# Patient Record
Sex: MALE | Race: WHITE
[De-identification: names, ages, dates, MRNs, and addresses within clinical notes are randomized per-mention and may not be internally consistent; named-entity substitution may affect disease eponyms.]

---

## 2017-02-16 ENCOUNTER — HOSPITAL ENCOUNTER (OUTPATIENT)
Dept: HOSPITAL 61 - ECHO | Age: 47
Discharge: HOME | End: 2017-02-16
Attending: INTERNAL MEDICINE
Payer: COMMERCIAL

## 2017-02-16 DIAGNOSIS — I25.10: Primary | ICD-10-CM

## 2017-02-16 DIAGNOSIS — I35.1: ICD-10-CM

## 2017-02-16 DIAGNOSIS — I34.0: ICD-10-CM

## 2017-02-16 PROCEDURE — 93306 TTE W/DOPPLER COMPLETE: CPT

## 2017-02-16 NOTE — CARD
--------------- APPROVED REPORT --------------





EXAM: Two-dimensional and M-mode echocardiogram with Doppler and color Doppler.



Other Information 

Quality : GoodHR: 56bpm

Rhythm : NSR, Bradycardia



INDICATION

Cardiac Disease: CAD 



2D DIMENSIONS 

RVDd3.2 (2.9-3.5cm)Left Atrium(2D)4.3 (1.6-4.0cm)

IVSd0.9 (0.7-1.1cm)Aortic Root(2D)3.1 (2.0-3.7cm)

LVDd4.3 (3.9-5.9cm)LVOT Diameter2.0 (1.8-2.4cm)

PWd0.7 (0.7-1.1cm)LVDs2.7 (2.5-4.0cm)

FS (%) 38.4 %SV57.9 ml

LVEF(%)68.9 (>50%)



Aortic Valve

AoV Peak Higinio.103.6cm/sAoV VTI24.3cm

AO Peak GR.4.3mmHgLVOT Peak Higinio.78.5cm/s

LVOT  VTI 17.91cmAO Mean GR.2mmHg

OJSE (VMAX)2.80ow2AXS   (VTI)2.42cm2



Mitral Valve

MV E Bgqgtucc32.1cm/sMV DECEL HUTR099mu

MV A Inwryziw41.8cm/sMV E Mean Gr.1mmHg

MV SXB70phL/A  Ratio1.6

MV A Ittqiumk546emHYM (PHT)3.81cm2



TDI

E/Lateral E'5.0E/Medial E'7.5



Pulmonary Valve

PV Peak Kcuzfiuu76.9cm/sPV Peak Grad.2mmHg

RVOT VTI12.0cm



Tricuspid Valve

TR P. Bffzjuze085tt/sRAP OHVIBOZQ2ykHw

TR Peak Gr.16tuWiQTJZ11lqZf



 LEFT VENTRICLE 

The left ventricle is normal size. There is normal left ventricular wall thickness. Left ventricle sy
stolic function is normal. The Ejection Fraction is 55-60%. There is normal LV segmental wall motion.
 The left ventricular diastolic function and filling is normal for age. There is no ventricular septa
l defect visualized.



 RIGHT VENTRICLE 

The right ventricle appears mildly dilated. There is normal right ventricular wall thickness. The rig
ht ventricular systolic function is normal.



 ATRIA 

The left atrium size is normal. The right atrium size is normal. The interatrial septum is intact wit
h no evidence for an atrial septal defect or patent foramen ovale as noted on 2-D or Doppler imaging.




 AORTIC VALVE 

The aortic valve is normal in structure and function. The aortic valve is trileaflet. Doppler and Col
or Flow revealed trace aortic regurgitation. There is no significant aortic valvular stenosis.



 MITRAL VALVE 

The mitral valve is normal in structure. There is no evidence of mitral valve prolapse. There is no m
itral valve stenosis. Doppler and Color Flow revealed trace mitral regurgitation.



 TRICUSPID VALVE 

The tricuspid valve is normal in structure and function. Doppler and Color Flow revealed trace tricus
pid regurgitation. There is no pulmonary hypertension. The PA pressure was estimated at 22 mmHg. Ther
e is no tricuspid valve stenosis.



 PULMONIC VALVE 

Doppler and Color Flow revealed no pulmonic valvular regurgitation. There is no pulmonic valvular makeda
nosis.



 GREAT VESSELS 

The aortic root is normal in size. The ascending aorta is normal in size. The IVC is normal in size a
nd collapses >50% with inspiration.



 PERICARDIAL EFFUSION 

There is no evidence of significant pericardial effusion.



Critical Notification

Critical Value: No



<Conclusion>

Left ventricle systolic function is normal.  The Ejection Fraction is 55-60%.

There is normal LV segmental wall motion.

The right ventricle appears mildly dilated.

The right ventricular systolic function is normal.

## 2017-08-22 ENCOUNTER — HOSPITAL ENCOUNTER (OUTPATIENT)
Dept: HOSPITAL 61 - NM | Age: 47
Discharge: HOME | End: 2017-08-22
Attending: INTERNAL MEDICINE
Payer: COMMERCIAL

## 2017-08-22 DIAGNOSIS — I51.7: ICD-10-CM

## 2017-08-22 DIAGNOSIS — I25.10: Primary | ICD-10-CM

## 2017-08-22 PROCEDURE — 93017 CV STRESS TEST TRACING ONLY: CPT

## 2017-08-22 PROCEDURE — 78452 HT MUSCLE IMAGE SPECT MULT: CPT

## 2017-08-22 PROCEDURE — A9500 TC99M SESTAMIBI: HCPCS

## 2017-08-22 PROCEDURE — 96376 TX/PRO/DX INJ SAME DRUG ADON: CPT

## 2017-08-22 PROCEDURE — 96374 THER/PROPH/DIAG INJ IV PUSH: CPT

## 2017-08-22 PROCEDURE — 96375 TX/PRO/DX INJ NEW DRUG ADDON: CPT

## 2017-08-24 NOTE — RAD
--------------- APPROVED REPORT --------------





Test Type:          Pharmacological

Stress Nurse/Tech: Carly Chávez R.N.

Test Indications: cad Jan2016 4 stents

Cardiac History: see ehr

Medications:     see ehr

Medical History: see ehr

Resting ECG:     SB

Resting Heart Rate: 49 bpm

Resting Blood Pressure: 153/85mmHg

Pretest Chest Pain: No chest pain



Nurse/Tech Notes

lungs cta, heart tones regular, good radial pulse

Consent: The procedure was explained to the patient in lay terms. Informed consent was witnessed. Bubba
eout was entered into [employer]. History and Stress Test performed by Carly Chávez R.N.



Pharm. Details

Pharmacologic stress testing was performed using 0.4mg per 5ml of regadenoson given intravenously ove
r 7-10 seconds.



Stress Symptoms

No chest pain or symptoms.



POST EXERCISE

Reason for Termination: Infusion complete

Target HR: No

Max HR: 88 bpm

Max Blood Pressure: 117/72mmHg

Chest Pain: No. 

Arrhythmia: No. 

ST Change: No. 



INTERPRETATION

Stress EKG Conclusion: Baseline EKG showed sinus rhythm.  No ischemic changes at peak stress.  No arr
hythmias.



Rest:            Stress:         Viability:   

Radiopharm.Tc99m XvmgsbtrmVp98p Sestamibi

Cyfy90xCh            34mCi            

Duration    20min.           15min.           

Img Date  08/22/2017 08/22/2017      

Inj-Img Osuv34knf.           60min.           



Rest Admin Site:IV - Right AntecubitalAdministrator:RT Diana (R)(N)

Stress Admin Site: IV - Right AntecubitalAdministrator: NAVA Martinez



STRESS DATA

End Diast. Vol.112.0mlAv. Heart Rate56.0bpm

End Syst. Vol.34.0mlCO Index BSA0.0L/min

Myocardial Xjvz140.0gEject. Sbyrngjm91.0%



Stress Rates

Pk. Fill Rate2.89EDV/secLVtime Pk. Fill 218.45msec

Pk. Empty Rate3.58ESV/secLVtime Pk. Acdqb040.23msec

1/3 Pk. Fill1.31EDV/sec



Stress Scores

Regional WT0.00Summed WT1.00

Regional WM0.00Summed WM0.00



Study quality was good.  Left Ventricular size was Normal at Rest and Stress.

Lung uptake was Normal.  Left Ventricular ejection fraction is 70%.

The rest and stress images show normal perfusion, normal contraction and thickening.



LV Perf. Quant

17 Seg. SSS1.00

17 Seg. SRS0.00

17 Seg. SDS1.00

Stress Defect Extent (% LAD)0.00Rest Defect Extent (% LAD)0.00Rev. Defect Extent (% LAD)0.00

Stress Defect Extent (% LCX) 1.30Rest Defect Extent (% LCX)0.00Rev. Defect Extent (% LCX)1.30

Stress Defect Extent (% RCA)0.00Rest Defect Extent (% RCA)0.00Rev. Defect Extent (% RCA)0.00

Stress Defect Extent (% MARY GRACE)0.20Rest Defect Extent (% MARY GRACE)0.00Rev. Defect Extent (% MARY GRACE)0.20



Conclusion

1. Regadenoson cardioisotope stress test did not show any evidence of ischemia or infarct.

2. Normal left ventricular systolic function with ejection fraction calculated at 70%.

3. Low risk for cardiac events.

## 2018-01-26 ENCOUNTER — HOSPITAL ENCOUNTER (OUTPATIENT)
Dept: HOSPITAL 61 - ENDOS | Age: 48
Discharge: HOME | End: 2018-01-26
Attending: INTERNAL MEDICINE
Payer: COMMERCIAL

## 2018-01-26 DIAGNOSIS — K29.50: ICD-10-CM

## 2018-01-26 DIAGNOSIS — I10: ICD-10-CM

## 2018-01-26 DIAGNOSIS — K22.2: Primary | ICD-10-CM

## 2018-01-26 DIAGNOSIS — Z79.82: ICD-10-CM

## 2018-01-26 DIAGNOSIS — M19.90: ICD-10-CM

## 2018-01-26 PROCEDURE — 43235 EGD DIAGNOSTIC BRUSH WASH: CPT

## 2018-01-26 RX ADMIN — SODIUM CHLORIDE, SODIUM LACTATE, POTASSIUM CHLORIDE, AND CALCIUM CHLORIDE 1 MLS/HR: .6; .31; .03; .02 INJECTION, SOLUTION INTRAVENOUS at 07:54

## 2018-06-22 VITALS
SYSTOLIC BLOOD PRESSURE: 116 MMHG | DIASTOLIC BLOOD PRESSURE: 82 MMHG | SYSTOLIC BLOOD PRESSURE: 116 MMHG | DIASTOLIC BLOOD PRESSURE: 82 MMHG

## 2019-07-16 ENCOUNTER — HOSPITAL ENCOUNTER (OUTPATIENT)
Dept: HOSPITAL 61 - ECHO | Age: 49
Discharge: HOME | End: 2019-07-16
Attending: INTERNAL MEDICINE
Payer: COMMERCIAL

## 2019-07-16 DIAGNOSIS — I08.8: Primary | ICD-10-CM

## 2019-07-16 DIAGNOSIS — I25.10: ICD-10-CM

## 2019-07-16 PROCEDURE — 93306 TTE W/DOPPLER COMPLETE: CPT

## 2019-07-16 NOTE — CARD
MR#: [id number]

Account#: [id number]

Accession#: [id number]

Date of Study: 07/16/2019

Ordering Physician: LILY MARRUFO, 

Referring Physician: LILY MARRUFO 

Tech: Ericka Samuels RDCS





--------------- APPROVED REPORT --------------





EXAM: Two-dimensional and M-mode echocardiogram with Doppler and color Doppler.



Other Information 

Quality : Good



INDICATION

Cardiac Disease: CAD 



2D DIMENSIONS 

RVDd3.0 (2.9-3.5cm)Left Atrium(2D)4.0 (1.6-4.0cm)

IVSd0.9 (0.7-1.1cm)Aortic Root(2D)3.4 (2.0-3.7cm)

LVDd5.3 (3.9-5.9cm)LVOT Diameter2.2 (1.8-2.4cm)

PWd0.9 (0.7-1.1cm)LVDs3.7 (2.5-4.0cm)

FS (%) 30.0 %SV76.6 ml

LVEF(%)56.8 (>50%)



Aortic Valve

AoV Peak Higinio.114.3cm/sAoV VTI25.2cm

AO Peak GR.5.2mmHgLVOT Peak Higinio.87.5cm/s

LVOT  VTI 20.64cmAO Mean GR.3mmHg

JOSE (VMAX)2.26kv3BSX   (VTI)3.02cm2



Mitral Valve

MV E Nyayvkgd11.1cm/sMV DECEL GWVS008he

MV A Ljqrymss43.8cm/sMV BNT16sn

E/A  Ratio1.6MVA (PHT)5.40cm2



TDI

E/Lateral E'9.4E/Medial E'10.7



Pulmonary Vein

S1 Adhjdomz69.0cm/sD2 Zbvhyuvw65.6cm/s



 LEFT VENTRICLE 

The left ventricle is normal size. There is normal left ventricular wall thickness. The left ventricu
lar systolic function is normal. The Ejection Fraction is 55-60%. There is normal LV segmental wall m
otion.



 RIGHT VENTRICLE 

The right ventricle is normal size. The right ventricular systolic function is normal.



 ATRIA 

The left atrium is mildly dilated. The right atrium size is normal. The interatrial septum is intact 
with no evidence for an atrial septal defect or patent foramen ovale as noted on 2-D or Doppler imagi
ng.



 AORTIC VALVE 

The aortic valve is normal in structure and function. Doppler and Color Flow revealed trace aortic re
gurgitation. There is no significant aortic valvular stenosis.



 MITRAL VALVE 

The mitral valve is calcified but opens well. There is no evidence of mitral valve prolapse. There is
 no mitral valve stenosis. Doppler and Color-flow revealed trace mitral regurgitation.



 TRICUSPID VALVE 

The tricuspid valve is normal in structure and function. Doppler and Color Flow revealed no tricuspid
 valve regurgitation noted. There is no tricuspid valve stenosis.



 PULMONIC VALVE 

The pulmonary valve is normal in structure and function. Doppler and Color Flow revealed trace to mil
d pulmonic valvular regurgitation. There is no pulmonic valvular stenosis.



 GREAT VESSELS 

The aortic root is normal in size. The ascending aorta is normal in size. The IVC is normal in size a
nd collapses >50% with inspiration.



 PERICARDIAL EFFUSION 

There is no evidence of significant pericardial effusion.



Critical Notification

Critical Value: No



<Conclusion>

The left ventricular systolic function is normal.

The Ejection Fraction is 55-60%.

There is normal LV segmental wall motion.

Trace aortic regurgitation.

Trace mitral regurgitation.

There is no evidence of significant pericardial effusion.



Signed by : Lily Marrufo, 

Electronically Approved : 07/16/2019 13:31:44

## 2019-12-11 ENCOUNTER — HOSPITAL ENCOUNTER (OUTPATIENT)
Dept: HOSPITAL 61 - NM | Age: 49
Discharge: HOME | End: 2019-12-11
Attending: INTERNAL MEDICINE
Payer: COMMERCIAL

## 2019-12-11 DIAGNOSIS — I25.10: Primary | ICD-10-CM

## 2019-12-11 DIAGNOSIS — Z95.818: ICD-10-CM

## 2019-12-11 PROCEDURE — 78452 HT MUSCLE IMAGE SPECT MULT: CPT

## 2019-12-11 PROCEDURE — A9500 TC99M SESTAMIBI: HCPCS

## 2019-12-11 PROCEDURE — 93017 CV STRESS TEST TRACING ONLY: CPT

## 2019-12-11 NOTE — RAD
MR#: [id number]

Account#: [id number]

Accession#: [id number]

Date of Study: 12/11/2019

Ordering Physician: LILY MARRUFO, 

Referring Physician: CLARISSA FORTE Tech: TERRY Chou ARRT (R) (N)





--------------- APPROVED REPORT --------------





Test Type:          Pharmacological

Stress Nurse/Tech: Carly Chávez R.N.

Test Indications: cad

Cardiac History: stents x 4

Medications:     see ehr

Medical History: see ehr

Resting ECG:     SR

Resting Heart Rate: 58 bpm

Resting Blood Pressure: 105/68mmHg

Pretest Chest Pain: No chest pain



Nurse/Tech Notes

lungs cta, heart tones regular

Consent: The procedure was explained to the patient in lay terms. Informed consent was witnessed. Bubba
eout was entered into [employer]. History and Stress Test performed by RT Diana (R) (N)



Pharm. Details

Pharmacologic stress testing was performed using 0.4mg per 5ml of regadenoson given intravenously ove
r 7-10 seconds.



Stress Symptoms

No chest pain or symptoms.



POST EXERCISE

Reason for Termination: Infusion complete

Target HR: No

Max HR: 90 bpm

Max Blood Pressure: 114/72mmHg

Chest Pain: No. 

Arrhythmia: No. 

ST Change: No. 



INTERPRETATION

Stress EKG Conclusion: Baseline EKG showed sinus rhythm.  No ischemic changes at peak stress.  No arr
hythmias.



Imaging Protocol

IMAGE PROTOCOL: Rest Tc-99m/stress Tc-99m 1 day



Rest:            Stress:         Viability:   

Radiopharm.Tc99m JhvrhvythPi29g Sestamibi

Xcit10jLz            31mCi            

Img Date  12/11/2019 12/11/2019      

Inj-Img Pvmc80uod.           60min.           



Rest Admin Site:IV - Right AntecubitalAdministrator:TERRY Chou ARRT (R)(N)

Stress Admin Site: IV - Right AntecubitalAdministrator: RT MARIIA Ortiz)(N)



STRESS DATA

End Diast. Vol.101.0mlLVEDV index BSA45.0ml

End Syst. Vol.38.0mlLVESV index BSA17.0ml

Myocardial Tncy845.0gEject. Onwmlcpb37.0%



Stress Scores

Regional WT1.00Summed WT15.00

Regional WM0.00Summed WM0.00



Study quality was good.  Left Ventricular size was Normal at Rest and Stress.

Lung uptake was .  Left Ventricular ejection fraction is 69%.

The rest and stress images show normal perfusion, normal contraction and thickening.



LV Perf. Quant

17 Seg. SSS2.00

17 Seg. SRS1.00

17 Seg. SDS1.00

Stress Defect Extent (% LAD)0.00Rest Defect Extent (% LAD)0.00Rev. Defect Extent (% LAD)0.00

Stress Defect Extent (% LCX) 8.80Rest Defect Extent (% LCX)2.50Rev. Defect Extent (% LCX)0.00

Stress Defect Extent (% RCA)0.00Rest Defect Extent (% RCA)0.00Rev. Defect Extent (% RCA)0.00

Stress Defect Extent (% MARY GRACE)3.30Rest Defect Extent (% MARY GRACE)0.90Rev. Defect Extent (% MARY GRACE)0.20



Conclusion

1. Regadenoson cardioisotope stress test did not show any evidence of ischemia or infarct.

2. Normal left ventricular systolic function with ejection fraction calculated at 69%.

3. Low risk for cardiac events.



Signed by : Lily Marrufo 

Electronically Approved : 12/11/2019 11:43:52

## 2020-08-19 ENCOUNTER — HOSPITAL ENCOUNTER (OUTPATIENT)
Dept: HOSPITAL 61 - ER | Age: 50
Setting detail: OBSERVATION
LOS: 1 days | Discharge: HOME | End: 2020-08-20
Attending: STUDENT IN AN ORGANIZED HEALTH CARE EDUCATION/TRAINING PROGRAM | Admitting: STUDENT IN AN ORGANIZED HEALTH CARE EDUCATION/TRAINING PROGRAM
Payer: COMMERCIAL

## 2020-08-19 VITALS — BODY MASS INDEX: 30.77 KG/M2 | HEIGHT: 70 IN | WEIGHT: 214.95 LBS
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
## 2020-08-19 DIAGNOSIS — Z20.828: ICD-10-CM

## 2020-08-19 DIAGNOSIS — Z79.82: ICD-10-CM

## 2020-08-19 DIAGNOSIS — I10: ICD-10-CM
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
## 2020-08-19 DIAGNOSIS — R07.89: ICD-10-CM

## 2020-08-19 DIAGNOSIS — Z79.02: ICD-10-CM

## 2020-08-19 DIAGNOSIS — Z95.1: ICD-10-CM

## 2020-08-19 DIAGNOSIS — I25.10: ICD-10-CM

## 2020-08-19 DIAGNOSIS — T18.128A: Primary | ICD-10-CM

## 2020-08-19 DIAGNOSIS — R11.0: ICD-10-CM
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
## 2020-08-19 DIAGNOSIS — K22.2: ICD-10-CM

## 2020-08-19 DIAGNOSIS — E78.5: ICD-10-CM
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
## 2020-08-19 DIAGNOSIS — Z95.5: ICD-10-CM

## 2020-08-19 DIAGNOSIS — Z79.899: ICD-10-CM

## 2020-08-19 DIAGNOSIS — M10.9: ICD-10-CM

## 2020-08-19 LAB
ALBUMIN SERPL-MCNC: 3.9 G/DL (ref 3.4–5)
ALBUMIN/GLOB SERPL: 1.2 {RATIO} (ref 1–1.7)
ALP SERPL-CCNC: 164 U/L (ref 46–116)
ALT SERPL-CCNC: 36 U/L (ref 16–63)
ANION GAP SERPL CALC-SCNC: 5 MMOL/L (ref 6–14)
AST SERPL-CCNC: 21 U/L (ref 15–37)
BASOPHILS # BLD AUTO: 0.1 X10^3/UL (ref 0–0.2)
BASOPHILS NFR BLD: 1 % (ref 0–3)
BILIRUB SERPL-MCNC: 1.3 MG/DL (ref 0.2–1)
BUN SERPL-MCNC: 13 MG/DL (ref 8–26)
BUN/CREAT SERPL: 11 (ref 6–20)
CALCIUM SERPL-MCNC: 9.3 MG/DL (ref 8.5–10.1)
CHLORIDE SERPL-SCNC: 104 MMOL/L (ref 98–107)
CO2 SERPL-SCNC: 31 MMOL/L (ref 21–32)
CREAT SERPL-MCNC: 1.2 MG/DL (ref 0.7–1.3)
EOSINOPHIL NFR BLD: 0.2 X10^3/UL (ref 0–0.7)
EOSINOPHIL NFR BLD: 3 % (ref 0–3)
ERYTHROCYTE [DISTWIDTH] IN BLOOD BY AUTOMATED COUNT: 14.1 % (ref 11.5–14.5)
GFR SERPLBLD BASED ON 1.73 SQ M-ARVRAT: 64.9 ML/MIN
GLOBULIN SER-MCNC: 3.3 G/DL (ref 2.2–3.8)
GLUCOSE SERPL-MCNC: 125 MG/DL (ref 70–99)
HCT VFR BLD CALC: 47.5 % (ref 39–53)
HGB BLD-MCNC: 16.3 G/DL (ref 13–17.5)
LIPASE: 139 U/L (ref 73–393)
LYMPHOCYTES # BLD: 2.2 X10^3/UL (ref 1–4.8)
LYMPHOCYTES NFR BLD AUTO: 27 % (ref 24–48)
MCH RBC QN AUTO: 30 PG (ref 25–35)
MCHC RBC AUTO-ENTMCNC: 34 G/DL (ref 31–37)
MCV RBC AUTO: 89 FL (ref 79–100)
MONO #: 0.5 X10^3/UL (ref 0–1.1)
MONOCYTES NFR BLD: 6 % (ref 0–9)
NEUT #: 5.2 X10^3/UL (ref 1.8–7.7)
NEUTROPHILS NFR BLD AUTO: 63 % (ref 31–73)
PLATELET # BLD AUTO: 266 X10^3/UL (ref 140–400)
POTASSIUM SERPL-SCNC: 4.2 MMOL/L (ref 3.5–5.1)
PROT SERPL-MCNC: 7.2 G/DL (ref 6.4–8.2)
RBC # BLD AUTO: 5.36 X10^6/UL (ref 4.3–5.7)
SODIUM SERPL-SCNC: 140 MMOL/L (ref 136–145)
WBC # BLD AUTO: 8.2 X10^3/UL (ref 4–11)

## 2020-08-19 PROCEDURE — 93005 ELECTROCARDIOGRAM TRACING: CPT

## 2020-08-19 PROCEDURE — G0378 HOSPITAL OBSERVATION PER HR: HCPCS

## 2020-08-19 PROCEDURE — 96376 TX/PRO/DX INJ SAME DRUG ADON: CPT

## 2020-08-19 PROCEDURE — 83690 ASSAY OF LIPASE: CPT

## 2020-08-19 PROCEDURE — 36415 COLL VENOUS BLD VENIPUNCTURE: CPT

## 2020-08-19 PROCEDURE — 96375 TX/PRO/DX INJ NEW DRUG ADDON: CPT

## 2020-08-19 PROCEDURE — 96374 THER/PROPH/DIAG INJ IV PUSH: CPT

## 2020-08-19 PROCEDURE — G0379 DIRECT REFER HOSPITAL OBSERV: HCPCS

## 2020-08-19 PROCEDURE — 87426 SARSCOV CORONAVIRUS AG IA: CPT

## 2020-08-19 PROCEDURE — 80053 COMPREHEN METABOLIC PANEL: CPT

## 2020-08-19 PROCEDURE — 43247 EGD REMOVE FOREIGN BODY: CPT

## 2020-08-19 PROCEDURE — C1757 CATH, THROMBECTOMY/EMBOLECT: HCPCS

## 2020-08-19 PROCEDURE — 99285 EMERGENCY DEPT VISIT HI MDM: CPT

## 2020-08-19 PROCEDURE — 71046 X-RAY EXAM CHEST 2 VIEWS: CPT

## 2020-08-19 PROCEDURE — 85025 COMPLETE CBC W/AUTO DIFF WBC: CPT

## 2020-08-19 PROCEDURE — 84484 ASSAY OF TROPONIN QUANT: CPT

## 2020-08-19 PROCEDURE — 96361 HYDRATE IV INFUSION ADD-ON: CPT

## 2020-08-19 NOTE — PHYS DOC
Past Medical History


Past Medical History:  CAD


Additional Past Medical Histor:  Gout


Past Surgical History:  Angioplasty


Additional Past Surgical Histo:  stent x4


Smoking Status:  Never Smoker


Alcohol Use:  Occasionally


Drug Use:  None





General Adult


EDM:


Chief Complaint:  CHEST PAIN





HPI:


HPI:





Patient is a 49  year old male who presents with chest pain.  The patient 

reports that in the past he has had esophageal stricture which needed 

dilatation.  This was done years ago.  On Monday the patient reports that he was

eating some cottage cheese and when he swallowed the cottage cheese felt like it

got stuck.  He was unable to make the feeling go away and finally he vomited.  

He states that he did not induce vomiting.  He felt like things did, but since 

then he is having difficulty with drinking or eating anything.  He reports he is

able to swallow his saliva but if he drinks or eats anything will start vo

miting.  Yesterday he began to have pain in his chest which he says is cramping 

achy, left-sided, nonradiating and constant.  He reports is worse if he takes a 

deep breath or has to vomit.  He also complains of some left lower quadrant 

abdominal pain worse with movement or changes in position.  Patient denied 

fever, chills, sweats, shortness of breath, cough, change in smell or taste, 

diarrhea, rashes.  Patient also has a history of coronary artery disease reports

that this pain is not like his previous cardiac pain.





Review of Systems:


Review of Systems:


Constitutional:   Denies fever or chills. []


Eyes:   Denies change in visual acuity. []


HENT:   Denies nasal congestion or sore throat. [] 


Respiratory:   See HPI. [] 


Cardiovascular:   See HPI. [] 


GI:   See HPI [] 


:  Denies dysuria. [] 


Musculoskeletal:   Denies back pain or joint pain. [] 


Integument:   Denies rash. [] 


Neurologic:   Denies headache, focal weakness or sensory changes. [] 


Endocrine:   Denies polyuria or polydipsia. [] 


Lymphatic:  Denies swollen glands. [] 


Psychiatric:  Denies depression or anxiety. []





Heart Score:


Risk Factors:


Risk Factors:  DM, Current or recent (<one month) smoker, HTN, HLP, family 

history of CAD, obesity.


Risk Scores:


Score 0 - 3:  2.5% MACE over next 6 weeks - Discharge Home


Score 4 - 6:  20.3% MACE over next 6 weeks - Admit for Clinical Observation


Score 7 - 10:  72.7% MACE over next 6 weeks - Early Invasive Strategies





Current Medications:





Current Medications








 Medications


  (Trade)  Dose


 Ordered  Sig/Lynne  Start Time


 Stop Time Status Last Admin


Dose Admin


 


 Glucagon


  (Glucagen)  1 mg  1X  ONCE  8/19/20 19:45


 8/19/20 19:46 UNV  





 


 Morphine Sulfate


  (Morphine


 Sulfate)  5 mg  1X  ONCE  8/19/20 19:45


 8/19/20 19:46 UNV  














Allergies:


Allergies:





Allergies








Coded Allergies Type Severity Reaction Last Updated Verified


 


  No Known Drug Allergies    1/26/18 No











Physical Exam:


PE:





Constitutional: Well developed, well nourished, no acute distress, non-toxic 

appearance. []


HENT: Normocephalic, atraumatic, bilateral external ears normal, dry mucous 

membranes, posterior pharynx with erythema, no exudates, no oral exudates, nose 

normal. []


Eyes: PERRLA, EOMI, conjunctiva normal, no discharge. [] 


Neck: Normal range of motion, no tenderness, supple, no stridor. [] 


Cardiovascular:Heart rate regular rhythm, no murmur []


Lungs & Thorax:  Bilateral breath sounds clear to auscultation, chest wall 

tender to palpation []


Abdomen: Bowel sounds normal, soft, tender left lower quadrant worsening with 

increased abdominal wall tension, no masses, no pulsatile masses. [] 


Skin: Warm, dry, no erythema, no rash. [] 


Back: No CVA tenderness, iliolumbar muscle tenderness in the lower back. [] 


Extremities: No tenderness, no cyanosis, no clubbing, ROM intact, no edema. [] 


Neurologic: Alert and oriented X 3, normal motor function, normal sensory 

function, no focal deficits noted. []


Psychologic: Affect normal, judgement normal, mood normal. []





Current Patient Data:


Vital Signs:





                                   Vital Signs








  Date Time  Temp Pulse Resp B/P (MAP) Pulse Ox O2 Delivery O2 Flow Rate FiO2


 


8/19/20 19:00 98.2 57 26 107/79 (88) 97 Room Air  





 98.2       











EKG:


EKG:


Heart rate 56 bpm, normal sinus rhythm, leftward axis, incomplete right bundle 

branch block, otherwise normal ECG.  Comparison to June 20, 2018 unchanged []





Radiology/Procedures:


Radiology/Procedures:


[]





Course & Med Decision Making:


Course & Med Decision Making


Pertinent Labs and Imaging studies reviewed. (See chart for details)


2143-patient was seen and reevaluated multiple occasions his hospitalization 

here in the emergency department.  Unfortunately the patient continues to have 

problems with vomiting while swallowing.  His cardiac work-up however is 

negative and no further intervention or work-up is thought to be necessary since

 his pain is been continuous for the last 24 to 48 hours.  In addition his EKG 

did not show anything that was significant.  Lastly I did rule out a PE via 

PERC=0 with a low Wells risk group.


[]





Dragon Disclaimer:


Dragon Disclaimer:


This electronic medical record was generated, in whole or in part, using a voice

 recognition dictation system.





Departure


Departure


Impression:  


   Primary Impression:  


   Acute esophageal obstruction


   Additional Impressions:  


   Non-cardiac chest pain


   History of esophageal stricture


   Vomiting


   Qualified Codes:  R11.11 - Vomiting without nausea


Disposition:  09 ADMITTED AS INPATIENT


Condition:  STABLE


Referrals:  


ABBY KHAN MD (PCP)





Justicifation of Admission Dx:


Justifications for Admission:


Justification of Admission Dx:  Yes


Comments:


Acute esophageal obstruction, persistent vomiting











ANJU CHRIS MD          Aug 19, 2020 19:50

## 2020-08-19 NOTE — RAD
Study: CR CHEST PA   LATERAL

 

Indication: Chest pain.

 

Comparison: 6/20/2018

 

Findings:

 

Low lung volumes with minimal basilar volume loss. No lobar consolidation,

pleural effusion or pneumothorax. The cardiomediastinal silhouette is 

within normal limits for size.

 

Impression:

 

No acute radiographic abnormality of the chest. 

 

Electronically signed by: LADY HILLMAN MD (8/19/2020 8:41 PM) [id number]

## 2020-08-20 VITALS
DIASTOLIC BLOOD PRESSURE: 72 MMHG | DIASTOLIC BLOOD PRESSURE: 72 MMHG | DIASTOLIC BLOOD PRESSURE: 72 MMHG | SYSTOLIC BLOOD PRESSURE: 109 MMHG | DIASTOLIC BLOOD PRESSURE: 72 MMHG | SYSTOLIC BLOOD PRESSURE: 109 MMHG | SYSTOLIC BLOOD PRESSURE: 109 MMHG | SYSTOLIC BLOOD PRESSURE: 109 MMHG

## 2020-08-20 VITALS — SYSTOLIC BLOOD PRESSURE: 123 MMHG | DIASTOLIC BLOOD PRESSURE: 87 MMHG

## 2020-08-20 VITALS — DIASTOLIC BLOOD PRESSURE: 59 MMHG | SYSTOLIC BLOOD PRESSURE: 96 MMHG

## 2020-08-20 VITALS — DIASTOLIC BLOOD PRESSURE: 55 MMHG | SYSTOLIC BLOOD PRESSURE: 105 MMHG

## 2020-08-20 VITALS — SYSTOLIC BLOOD PRESSURE: 112 MMHG | DIASTOLIC BLOOD PRESSURE: 72 MMHG

## 2020-08-20 RX ADMIN — BACITRACIN SCH MLS/HR: 5000 INJECTION, POWDER, FOR SOLUTION INTRAMUSCULAR at 11:20

## 2020-08-20 RX ADMIN — BACITRACIN SCH MLS/HR: 5000 INJECTION, POWDER, FOR SOLUTION INTRAMUSCULAR at 00:23
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
## 2020-08-20 NOTE — PDOC4
Operative Note


Operative Note


EGD with foreign body removal


Meds propofol per anesthesia


Pre-op dx  dysphagia/meat impaction


Post-op dx meat impaction s/p extraction


                 schatzki ring


Plan o/p dilation in several weeks


       advance diet as tolerated and relase home today











CHALO MONROE MD             Aug 20, 2020 14:33

## 2020-08-20 NOTE — EKG
[facility]

              [address] [place] [address]

Test Date:    2020               Test Time:    19:03:21

Pat Name:     ERIKA KUMAR           Department:   

Patient ID:   [id number]           Room:          

Gender:       M                        Technician:   

:          1970               Requested By: ANJU CHRIS

Order Number: [id number]           Reading MD:     

                                 Measurements

Intervals                              Axis          

Rate:         56                       P:            0

VA:           144                      QRS:          -16

QRSD:         92                       T:            -3

QT:           420                                    

QTc:          408                                    

                           Interpretive Statements

SINUS RHYTHM

LEFTWARD AXIS

INCOMPLETE RIGHT BUNDLE BRANCH BLOCK

OTHERWISE NORMAL ECG

RI6.01

No previous ECG available for comparison

## 2020-08-20 NOTE — PDOC1
History and Physical


Date of Admission


Date of Admission


DATE: 8/20/20 


TIME: 15:49





Identification/Chief Complaint


Chief Complaint


Choking spells





Source


Source:  Patient





History of Present Illness


History of Present Illness


Patient is a 49-year-old male with past medical history of coronary artery 

disease, who presents to the ER with complaints of worsening "choking spell" 

over the past 2 days.  Patient states that his symptoms began after eating 

chicken 2 days prior to admission that got stuck in his esophagus. Since that 

time he reports worsening symptoms of upper chest pain, that became concerning 

to the patient for cardiac etiology.  At time of admission he rated his pain as 

7/10. Associated difficulty swallowing. He denies any aggravating or alleviating

factors.





Past Medical History


Cardiovascular:  CAD, HTN, Hyperlipidemia


Pulmonary:  No pertinent hx


CENTRAL NERVOUS SYSTEM:  Other


GI:  No pertinent hx


Heme/Onc:  No pertinent hx


Hepatobiliary:  No pertinent hx


Psych:  No pertinent hx


Musculoskeletal:  No pain


Rheumatologic:  Gout


Infectious disease:  No pertinent hx


Renal/:  No pertinent hx


Endocrine:  No pertinent hx





Past Surgical History


Past Surgical History:  Other (Cardiac stents x2)





Family History


Family History:  Heart Disease, Hypertension, Kidney Disease





Social History


Smoke:  No


ALCOHOL:  occassional


Drugs:  None





Current Problem List


Problem List


Problems


Medical Problems:


(1) Acute esophageal obstruction


Status: Acute  





(2) History of esophageal stricture


Status: Acute  





(3) Non-cardiac chest pain


Status: Acute  





(4) Vomiting


Status: Acute  











Current Medications


Current Medications





Current Medications


Glucagon (Glucagen) 1 mg 1X  ONCE IV  Last administered on 8/19/20at 19:58;  Sta

rt 8/19/20 at 20:00;  Stop 8/19/20 at 20:01;  Status DC


Morphine Sulfate (Morphine Sulfate) 5 mg 1X  ONCE IV  Last administered on 

8/19/20at 19:57;  Start 8/19/20 at 20:00;  Stop 8/19/20 at 20:01;  Status DC


Famotidine (Pepcid Vial) 20 mg 1X  ONCE IVP  Last administered on 8/19/20at 

19:57;  Start 8/19/20 at 20:00;  Stop 8/19/20 at 20:01;  Status DC


Sodium Chloride 1,000 ml @  1,000 mls/hr 1X  ONCE IV  Last administered on 

8/19/20at 19:57;  Start 8/19/20 at 20:00;  Stop 8/19/20 at 20:59;  Status DC


Ketorolac Tromethamine (Toradol 30mg Vial) 30 mg 1X  ONCE IVP  Last administered

on 8/19/20at 20:44;  Start 8/19/20 at 20:30;  Stop 8/19/20 at 20:31;  Status DC


Sodium Chloride 1,000 ml @  75 mls/hr [id number] IV  Last administered on 8/20/20at

00:23;  Start 8/19/20 at 22:00;  Stop 8/20/20 at 21:59


Morphine Sulfate (Morphine Sulfate) 2 mg 1X  PRN IV pain Last administered on 

8/20/20at 00:24;  Start 8/20/20 at 00:15;  Stop 8/20/20 at 00:30;  Status DC


Ondansetron HCl (Zofran) 4 mg 1X  PRN IVP nausea/vomiting Last administered on 

8/20/20at 00:24;  Start 8/20/20 at 00:15;  Stop 8/20/20 at 00:30;  Status DC


Morphine Sulfate (Morphine Sulfate) 2 mg PRN Q4HRS  PRN IV severe pain;  Start 

8/20/20 at 01:45


Ondansetron HCl (Zofran) 4 mg PRN Q6HRS  PRN IVP NAUSEA/VOMITING;  Start 8/20/20

at 01:45


Pantoprazole Sodium (PROTONIX VIAL for IV PUSH) 40 mg DAILYAC IVP ;  Start 

8/20/20 at 10:00


Ringer's Solution 1,000 ml @  75 mls/hr 1X  ONCE IV  Last administered on 

8/20/20at 12:45;  Start 8/20/20 at 12:45;  Stop 8/21/20 at 02:04


Propofol (Diprivan) 200 mg STK-MED ONCE IV ;  Start 8/20/20 at 13:29;  Stop 

8/20/20 at 13:30;  Status DC


Lidocaine HCl (Lidocaine Pf 2% Vial) 5 ml STK-MED ONCE .ROUTE ;  Start 8/20/20 

at 13:29;  Stop 8/20/20 at 13:30;  Status DC


Propofol (Diprivan) 200 mg STK-MED ONCE IV ;  Start 8/20/20 at 14:23;  Stop 

8/20/20 at 14:23;  Status DC





Active Scripts


Active


Reported


Toprol Xl (Metoprolol Succinate) 25 Mg Tab.er.24h 25 Mg PO DAILY


Colcrys (Colchicine) 0.6 Mg Tablet 0.6 Mg PO DAILY


Uloric (Febuxostat) 40 Mg Tablet 40 Mg PO DAILY


Lisinopril 2.5 Mg Tablet 2.5 Mg PO DAILY


Atorvastatin Calcium 40 Mg Tablet 40 Mg PO HS


Aspir 81 (Aspirin) 81 Mg Tablet.dr 81 Mg PO DAILY


Naproxen 500 Mg Tablet 500 Mg PO PRN BID PRN





Allergies


Allergies:  


Coded Allergies:  


     No Known Drug Allergies (Unverified , 8/20/20)





ROS


General:  No: Chills, Night Sweats, Fatigue, Malaise, Appetite, Other


PSYCHOLOGICAL ROS:  No: Anxiety, Behavioral Disorder, Concentration difficultie,

Decreased libido, Depression, Disorientation, Hallucinations, Hostility, Irrita

blity, Memory difficulties, Mood Swings, Obsessive thoughts, Physical abuse, 

Sexual abuse, Sleep disturbances, Suicidal ideation, Other


Eyes:  No Blurry vision, No Decreased vision, No Double vision, No Dry eyes, No 

Excessive tearing, No Eye Pain, No Itchy Eyes, No Loss of vision, No 

Photophobia, No Scotomata, No Uses contacts, No Uses glasses, No Other


HEENT:  YES: Other (Dysphasia); 


   No: Heacaches, Visual Changes, Hearing change, Nasal congestion, Nasal 

discharge, Oral lesions, Sinus pain, Sore Throat, Epistaxis, Sneezing, Snoring, 

Tinnitus, Vertigo, Vocal changes


ALLERGY AND IMMUNOLOGY:  No: Hives, Insect Bite Sensitivity, Itchy/Watery Eyes, 

Nasal Congestion, Post Nasal Drip, Seasonal Allergies, Other


Hematological and Lymphatic:  No: Bleeding Problems, Blood Clots, Blood Enamorado

sfusions, Brusing, Night Sweats, Pallor, Swollen Lymph Nodes, Other


Respiratory:  No: Cough, Hemoptysis, Orthopnea, Pleuritic Pain, Shortness of 

breath, SOB with excertion, Sputum Changes, Stridor, Tachypnea, Wheezing, Other


Cardiovascular:  yes Chest Pain


Gastrointestinal:  No Nausea, No Vomiting, No Abdominal Pain, No Diarrhea, No 

Constipation, No Melena, No Hematochezia, No Other


Musculoskeletal:  No Gait Disturbance, No Joint Pain, No Joint Stiffness, No 

Joint Swelling, No Muscle Pain, No Muscular Weakness, No Pain In:, No Swelling 

In:, No Other


Neurological:  No Behavorial Changes, No Bowel/Bladder ControlChng, No 

Confusion, No Dizziness, No Gait Disturbance, No Headaches, No Impaired 

Coord/balance, No Memory Loss, No Numbness/Tingling, No Seizures, No Speech 

Problems, No Tremors, No Visual Changes, No Weakness, No Other


Skin:  No Dry Skin, No Eczema, No Hair Changes, No Lumps, No Mole Changes, No 

Mottling, No Nail Changes, No Pruritus, No Rash, No Skin Lesion Changes, No 

Other, No Acne





Physical Exam


General:  Alert, Oriented X3, Cooperative, No acute distress


HEENT:  PERRLA


Lungs:  Clear to auscultation, Normal air movement


Heart:  RRR, no murmurs


Cardiovascular:  S1, S2


Abdomen:  Normal bowel sounds, Soft, No tenderness


Extremities:  No clubbing, No edema


Skin:  No rashes, No breakdown


Neuro:  Normal gait, Normal speech, Cranial nerves 3-12 NL


Psych/Mental Status:  Mental status NL, Mood NL





Vitals


Vitals





Vital Signs








  Date Time  Temp Pulse Resp B/P (MAP) Pulse Ox O2 Delivery O2 Flow Rate FiO2


 


8/20/20 15:25 98.9 64 18 109/72 (84) 96 Room Air  





 98.9       











Labs


Labs





Laboratory Tests








Test


 8/19/20


19:06 8/20/20


10:35


 


White Blood Count


 8.2 x10^3/uL


(4.0-11.0) 





 


Red Blood Count


 5.36 x10^6/uL


(4.30-5.70) 





 


Hemoglobin


 16.3 g/dL


(13.0-17.5) 





 


Hematocrit


 47.5 %


(39.0-53.0) 





 


Mean Corpuscular Volume 89 fL ()  


 


Mean Corpuscular Hemoglobin 30 pg (25-35)  


 


Mean Corpuscular Hemoglobin


Concent 34 g/dL


(31-37) 





 


Red Cell Distribution Width


 14.1 %


(11.5-14.5) 





 


Platelet Count


 266 x10^3/uL


(140-400) 





 


Neutrophils (%) (Auto) 63 % (31-73)  


 


Lymphocytes (%) (Auto) 27 % (24-48)  


 


Monocytes (%) (Auto) 6 % (0-9)  


 


Eosinophils (%) (Auto) 3 % (0-3)  


 


Basophils (%) (Auto) 1 % (0-3)  


 


Neutrophils # (Auto)


 5.2 x10^3/uL


(1.8-7.7) 





 


Lymphocytes # (Auto)


 2.2 x10^3/uL


(1.0-4.8) 





 


Monocytes # (Auto)


 0.5 x10^3/uL


(0.0-1.1) 





 


Eosinophils # (Auto)


 0.2 x10^3/uL


(0.0-0.7) 





 


Basophils # (Auto)


 0.1 x10^3/uL


(0.0-0.2) 





 


Sodium Level


 140 mmol/L


(136-145) 





 


Potassium Level


 4.2 mmol/L


(3.5-5.1) 





 


Chloride Level


 104 mmol/L


() 





 


Carbon Dioxide Level


 31 mmol/L


(21-32) 





 


Anion Gap 5 (6-14)  


 


Blood Urea Nitrogen


 13 mg/dL


(8-26) 





 


Creatinine


 1.2 mg/dL


(0.7-1.3) 





 


Estimated GFR


(Cockcroft-Gault) 64.9 


 





 


BUN/Creatinine Ratio 11 (6-20)  


 


Glucose Level


 125 mg/dL


(70-99) 





 


Calcium Level


 9.3 mg/dL


(8.5-10.1) 





 


Total Bilirubin


 1.3 mg/dL


(0.2-1.0) 





 


Aspartate Amino Transf


(AST/SGOT) 21 U/L (15-37) 


 





 


Alanine Aminotransferase


(ALT/SGPT) 36 U/L (16-63) 


 





 


Alkaline Phosphatase


 164 U/L


() 





 


Troponin I Quantitative


 < 0.017 ng/mL


(0.000-0.055) 





 


Total Protein


 7.2 g/dL


(6.4-8.2) 





 


Albumin


 3.9 g/dL


(3.4-5.0) 





 


Albumin/Globulin Ratio 1.2 (1.0-1.7)  


 


Lipase


 139 U/L


() 





 


SARS-CoV-2 Antigen (Rapid)


 


 Negative


(NEGATIVE)








Laboratory Tests








Test


 8/19/20


19:06 8/20/20


10:35


 


White Blood Count


 8.2 x10^3/uL


(4.0-11.0) 





 


Red Blood Count


 5.36 x10^6/uL


(4.30-5.70) 





 


Hemoglobin


 16.3 g/dL


(13.0-17.5) 





 


Hematocrit


 47.5 %


(39.0-53.0) 





 


Mean Corpuscular Volume 89 fL ()  


 


Mean Corpuscular Hemoglobin 30 pg (25-35)  


 


Mean Corpuscular Hemoglobin


Concent 34 g/dL


(31-37) 





 


Red Cell Distribution Width


 14.1 %


(11.5-14.5) 





 


Platelet Count


 266 x10^3/uL


(140-400) 





 


Neutrophils (%) (Auto) 63 % (31-73)  


 


Lymphocytes (%) (Auto) 27 % (24-48)  


 


Monocytes (%) (Auto) 6 % (0-9)  


 


Eosinophils (%) (Auto) 3 % (0-3)  


 


Basophils (%) (Auto) 1 % (0-3)  


 


Neutrophils # (Auto)


 5.2 x10^3/uL


(1.8-7.7) 





 


Lymphocytes # (Auto)


 2.2 x10^3/uL


(1.0-4.8) 





 


Monocytes # (Auto)


 0.5 x10^3/uL


(0.0-1.1) 





 


Eosinophils # (Auto)


 0.2 x10^3/uL


(0.0-0.7) 





 


Basophils # (Auto)


 0.1 x10^3/uL


(0.0-0.2) 





 


Sodium Level


 140 mmol/L


(136-145) 





 


Potassium Level


 4.2 mmol/L


(3.5-5.1) 





 


Chloride Level


 104 mmol/L


() 





 


Carbon Dioxide Level


 31 mmol/L


(21-32) 





 


Anion Gap 5 (6-14)  


 


Blood Urea Nitrogen


 13 mg/dL


(8-26) 





 


Creatinine


 1.2 mg/dL


(0.7-1.3) 





 


Estimated GFR


(Cockcroft-Gault) 64.9 


 





 


BUN/Creatinine Ratio 11 (6-20)  


 


Glucose Level


 125 mg/dL


(70-99) 





 


Calcium Level


 9.3 mg/dL


(8.5-10.1) 





 


Total Bilirubin


 1.3 mg/dL


(0.2-1.0) 





 


Aspartate Amino Transf


(AST/SGOT) 21 U/L (15-37) 


 





 


Alanine Aminotransferase


(ALT/SGPT) 36 U/L (16-63) 


 





 


Alkaline Phosphatase


 164 U/L


() 





 


Troponin I Quantitative


 < 0.017 ng/mL


(0.000-0.055) 





 


Total Protein


 7.2 g/dL


(6.4-8.2) 





 


Albumin


 3.9 g/dL


(3.4-5.0) 





 


Albumin/Globulin Ratio 1.2 (1.0-1.7)  


 


Lipase


 139 U/L


() 





 


SARS-CoV-2 Antigen (Rapid)


 


 Negative


(NEGATIVE)











VTE Prophylaxis Ordered


VTE Prophylaxis Devices:  No


VTE Pharmacological Prophylaxi:  Yes





Assessment/Plan


Assessment/Plan


Foreign body stuck in throat: Patient was taken to the OR and had an EGD with 

removal of foreign body, per GI.  Will advance soft diet as tolerated and 

discharge home today if possible.  Currently denies any chest pain or difficulty

swallowing.





Justicifation of Admission Dx:


Justifications for Admission:


Justification of Admission Dx:  Yes











DAVIDE CORBIN MD            Aug 20, 2020 15:58

## 2020-08-20 NOTE — NUR
Discharge Note:



ERIKA KUMAR



Discharge instructions and discharge home medications reviewed with Patient and a copy 
given. All questions have been answered and understanding verbalized. 



The following instructions and handouts were given: discharge instructions were given. 
Patient tolerated the Gi Soft diet well.



Discontinued lines and drains: catheter tip intact. Patient tolerated well. 



Patient discharged home via personal vehicle with wife.

## 2020-08-20 NOTE — PDOC3
Discharge Summary


Visit Information


Date of Admission:  Aug 20, 2020


Date of Discharge:  Aug 20, 2020


Final Diagnosis


Problems


Medical Problems:


(1) Acute esophageal obstruction


Status: Acute  





(2) History of esophageal stricture


Status: Acute  





(3) Non-cardiac chest pain


Status: Acute  





(4) Vomiting


Status: Acute  











Brief Hospital Course


Allergies





                                    Allergies








Coded Allergies Type Severity Reaction Last Updated Verified


 


  No Known Drug Allergies    8/20/20 No








Vital Signs





Vital Signs








  Date Time  Temp Pulse Resp B/P (MAP) Pulse Ox O2 Delivery O2 Flow Rate FiO2


 


8/20/20 15:25 98.9 64 18 109/72 (84) 96 Room Air  





 98.9       








Lab Results





Laboratory Tests








Test


 8/19/20


19:06 8/20/20


10:35


 


White Blood Count


 8.2 x10^3/uL


(4.0-11.0) 





 


Red Blood Count


 5.36 x10^6/uL


(4.30-5.70) 





 


Hemoglobin


 16.3 g/dL


(13.0-17.5) 





 


Hematocrit


 47.5 %


(39.0-53.0) 





 


Mean Corpuscular Volume 89 fL ()  


 


Mean Corpuscular Hemoglobin 30 pg (25-35)  


 


Mean Corpuscular Hemoglobin


Concent 34 g/dL


(31-37) 





 


Red Cell Distribution Width


 14.1 %


(11.5-14.5) 





 


Platelet Count


 266 x10^3/uL


(140-400) 





 


Neutrophils (%) (Auto) 63 % (31-73)  


 


Lymphocytes (%) (Auto) 27 % (24-48)  


 


Monocytes (%) (Auto) 6 % (0-9)  


 


Eosinophils (%) (Auto) 3 % (0-3)  


 


Basophils (%) (Auto) 1 % (0-3)  


 


Neutrophils # (Auto)


 5.2 x10^3/uL


(1.8-7.7) 





 


Lymphocytes # (Auto)


 2.2 x10^3/uL


(1.0-4.8) 





 


Monocytes # (Auto)


 0.5 x10^3/uL


(0.0-1.1) 





 


Eosinophils # (Auto)


 0.2 x10^3/uL


(0.0-0.7) 





 


Basophils # (Auto)


 0.1 x10^3/uL


(0.0-0.2) 





 


Sodium Level


 140 mmol/L


(136-145) 





 


Potassium Level


 4.2 mmol/L


(3.5-5.1) 





 


Chloride Level


 104 mmol/L


() 





 


Carbon Dioxide Level


 31 mmol/L


(21-32) 





 


Anion Gap 5 (6-14)  


 


Blood Urea Nitrogen


 13 mg/dL


(8-26) 





 


Creatinine


 1.2 mg/dL


(0.7-1.3) 





 


Estimated GFR


(Cockcroft-Gault) 64.9 


 





 


BUN/Creatinine Ratio 11 (6-20)  


 


Glucose Level


 125 mg/dL


(70-99) 





 


Calcium Level


 9.3 mg/dL


(8.5-10.1) 





 


Total Bilirubin


 1.3 mg/dL


(0.2-1.0) 





 


Aspartate Amino Transf


(AST/SGOT) 21 U/L (15-37) 


 





 


Alanine Aminotransferase


(ALT/SGPT) 36 U/L (16-63) 


 





 


Alkaline Phosphatase


 164 U/L


() 





 


Troponin I Quantitative


 < 0.017 ng/mL


(0.000-0.055) 





 


Total Protein


 7.2 g/dL


(6.4-8.2) 





 


Albumin


 3.9 g/dL


(3.4-5.0) 





 


Albumin/Globulin Ratio 1.2 (1.0-1.7)  


 


Lipase


 139 U/L


() 





 


SARS-CoV-2 Antigen (Rapid)


 


 Negative


(NEGATIVE)








Laboratory Tests








Test


 8/19/20


19:06 8/20/20


10:35


 


White Blood Count


 8.2 x10^3/uL


(4.0-11.0) 





 


Red Blood Count


 5.36 x10^6/uL


(4.30-5.70) 





 


Hemoglobin


 16.3 g/dL


(13.0-17.5) 





 


Hematocrit


 47.5 %


(39.0-53.0) 





 


Mean Corpuscular Volume 89 fL ()  


 


Mean Corpuscular Hemoglobin 30 pg (25-35)  


 


Mean Corpuscular Hemoglobin


Concent 34 g/dL


(31-37) 





 


Red Cell Distribution Width


 14.1 %


(11.5-14.5) 





 


Platelet Count


 266 x10^3/uL


(140-400) 





 


Neutrophils (%) (Auto) 63 % (31-73)  


 


Lymphocytes (%) (Auto) 27 % (24-48)  


 


Monocytes (%) (Auto) 6 % (0-9)  


 


Eosinophils (%) (Auto) 3 % (0-3)  


 


Basophils (%) (Auto) 1 % (0-3)  


 


Neutrophils # (Auto)


 5.2 x10^3/uL


(1.8-7.7) 





 


Lymphocytes # (Auto)


 2.2 x10^3/uL


(1.0-4.8) 





 


Monocytes # (Auto)


 0.5 x10^3/uL


(0.0-1.1) 





 


Eosinophils # (Auto)


 0.2 x10^3/uL


(0.0-0.7) 





 


Basophils # (Auto)


 0.1 x10^3/uL


(0.0-0.2) 





 


Sodium Level


 140 mmol/L


(136-145) 





 


Potassium Level


 4.2 mmol/L


(3.5-5.1) 





 


Chloride Level


 104 mmol/L


() 





 


Carbon Dioxide Level


 31 mmol/L


(21-32) 





 


Anion Gap 5 (6-14)  


 


Blood Urea Nitrogen


 13 mg/dL


(8-26) 





 


Creatinine


 1.2 mg/dL


(0.7-1.3) 





 


Estimated GFR


(Cockcroft-Gault) 64.9 


 





 


BUN/Creatinine Ratio 11 (6-20)  


 


Glucose Level


 125 mg/dL


(70-99) 





 


Calcium Level


 9.3 mg/dL


(8.5-10.1) 





 


Total Bilirubin


 1.3 mg/dL


(0.2-1.0) 





 


Aspartate Amino Transf


(AST/SGOT) 21 U/L (15-37) 


 





 


Alanine Aminotransferase


(ALT/SGPT) 36 U/L (16-63) 


 





 


Alkaline Phosphatase


 164 U/L


() 





 


Troponin I Quantitative


 < 0.017 ng/mL


(0.000-0.055) 





 


Total Protein


 7.2 g/dL


(6.4-8.2) 





 


Albumin


 3.9 g/dL


(3.4-5.0) 





 


Albumin/Globulin Ratio 1.2 (1.0-1.7)  


 


Lipase


 139 U/L


() 





 


SARS-CoV-2 Antigen (Rapid)


 


 Negative


(NEGATIVE)








Brief Hospital Course


Mr. Corey  is a 49 old male who presented with acute esophageal obstruction. 

Symptoms began after choking on a chicken 3 days prior to admission.  Consults 

were placed to GI, who performed an endoscopic removal of esophageal foreign 

body.  Patient tolerated full liquid diet after procedure, and was discharged 

home same date of admission.





Assessment


Assessment


Times improved after endoscopic removal of foreign body.  Stable for discharge.





Discharge Information


Condition at Discharge:  Improved


Disposition/Orders:  D/C to Home


Scheduled


Aspirin (Aspir 81) 81 Mg Tablet.dr, 81 MG PO DAILY, (Reported)


   Entered as Reported by: CAROL MALDONADO on 1/26/18 0749


Atorvastatin Calcium (Atorvastatin Calcium) 40 Mg Tablet, 40 MG PO HS for FOR 

CHOLESTEROL, #30 Ref 0 (Reported)


   Entered as Reported by: CAROL MALDONADO on 1/26/18 0749


Colchicine (Colcrys) 0.6 Mg Tablet, 0.6 MG PO DAILY, (Reported)


   Entered as Reported by: CAROL MALDONADO on 1/26/18 0749


Febuxostat (Uloric) 40 Mg Tablet, 40 MG PO DAILY, (Reported)


   Entered as Reported by: CAROL MALDONADO on 1/26/18 0749


Lisinopril (Lisinopril) 2.5 Mg Tablet, 2.5 MG PO DAILY for FOR HYPERTENSION, #30

Ref 0 (Reported)


   Entered as Reported by: CAROL MALDONADO on 1/26/18 0749


Metoprolol Succinate (Toprol Xl) 25 Mg Tab.er.24h, 25 MG PO DAILY for FOR 

HYPERTENSION, #30 Ref 0 (Reported)


   Entered as Reported by: CAROL MALDONADO on 1/26/18 0749





Scheduled PRN


Naproxen (Naproxen) 500 Mg Tablet, 500 MG PO PRN BID PRN for PAIN, #60 

(Reported)


   Entered as Reported by: MYKE CATES on 1/22/16 0620


   Last Action: Edited on 8/20/20 0331 by KAYLENE SMITH





Discontinued Medications


Aspirin (Aspir 81) 81 Mg Tablet.dr, 81 MG PO DAILY, (Reported)


   Entered as Reported by: THERESA REED on 1/23/16 1328


   Last Action: Discontinued on 8/20/20 0330 by KAYLENE SMITH


Atorvastatin Calcium (Lipitor) 40 Mg Tablet, 40 MG PO HS for FOR CHOLESTEROL, 

#30 Ref 0 (Reported)


   Entered as Reported by: THERESA REED on 1/23/16 1328


   Last Action: Discontinued on 8/20/20 0330 by KAYLENE SMITH


Colchicine (Colcrys) 0.6 Mg Tablet, 0.6 MG PO DAILY, #30 (Reported)


   Entered as Reported by: MYKE CATES on 1/22/16 0620


   Last Action: Discontinued on 8/20/20 0330 by KAYLENE SMITH


Febuxostat (Uloric) 40 Mg Tablet, 40 MG PO DAILY, #30 (Reported)


   Entered as Reported by: MYKE CATES on 1/22/16 0620


   Last Action: Discontinued on 8/20/20 0330 by KAYLENE SMITH


Lisinopril (Lisinopril) 2.5 Mg Tablet, 2.5 MG PO DAILY for FOR HYPERTENSION, #30

Ref 0 (Reported)


   Entered as Reported by: THERESA REED on 1/23/16 1327


   Last Action: Discontinued on 8/20/20 0330 by KAYLENE SMITH


Metoprolol Succinate (Metoprolol Succinate ( Xl )) 25 Mg Tab.er.24h, 25 MG PO 

DAILY for FOR HYPERTENSION, #30 Ref 0 (Reported)


   Entered as Reported by: THERESA REED on 1/23/16 1327


   Last Action: Discontinued on 8/20/20 0330 by KAYLENE SMITH


Prasugrel Hcl (Effient) 10 Mg Tablet, 10 MG PO DAILY, (Reported)


   Entered as Reported by: THERESA REED on 1/23/16 1328


   Last Action: Discontinued on 8/20/20 0330 by KAYLENE SMITH





Justicifation of Admission Dx:


Justifications for Admission:


Justification of Admission Dx:  Yes











DAVIDE CORBIN MD            Aug 20, 2020 16:59

## 2020-08-20 NOTE — NUR
SW following. Discussed with RN, pt from home, room air, NPO. COVID-19 pending for procedure 
today. Dr. Santos plans to discharge pt after procedure. RN advised no SW needs at this 
time. SW will continue to follow for any discharge planning needs.

## 2020-08-20 NOTE — NUR
Patient admitted from ER to room 438 per wheelchair. Admitting diagnosis: nausea and 
vomiting x36 hours, acute esophageal obstruction and non-cardiac chest pain. Patient states 
he was eating day before yesterday (chicken and cottage cheese) and he thinks some food may 
have gotten stuck in his throat. Since that time patient has not been able to keep any food 
or liquids down. If he tries to eat ar drink, he vomits. Patient has had a esophageal 
dilation in the past years ago. Consult with Dr. Ritter ordered. Patient has been placed 
NPO until he can be seen by Dr. Ritter later today. Patient has NKA. Patient in no acute 
distress at this time. Will continue to monitor.

## 2021-01-21 ENCOUNTER — HOSPITAL ENCOUNTER (OUTPATIENT)
Dept: HOSPITAL 61 - ECHO | Age: 51
End: 2021-01-21
Attending: INTERNAL MEDICINE
Payer: COMMERCIAL

## 2021-01-21 DIAGNOSIS — I25.10: Primary | ICD-10-CM

## 2021-01-21 PROCEDURE — 93306 TTE W/DOPPLER COMPLETE: CPT

## 2021-01-21 NOTE — CARD
MR#: [id number]

Account#: [id number]

Accession#: [id number]

Date of Study: 01/21/2021

Ordering Physician: LILY MARRUFO, 

Referring Physician: LILY MARRUFO 

Tech: Ericka Samuels RDCS





--------------- APPROVED REPORT --------------





EXAM: Two-dimensional and M-mode echocardiogram with Doppler and color Doppler.



Other Information 

Quality : Good



INDICATION

Cardiac Disease: CAD 



2D DIMENSIONS 

RVDd3.2 (2.9-3.5cm)Left Atrium(2D)4.2 (1.6-4.0cm)

IVSd1.1 (0.7-1.1cm)Aortic Root(2D)3.5 (2.0-3.7cm)

LVDd4.9 (3.9-5.9cm)LVOT Diameter2.2 (1.8-2.4cm)

PWd1.0 (0.7-1.1cm)LVDs3.3 (2.5-4.0cm)

FS (%) 32.0 %SV66.6 ml

LVEF(%)59.9 (>50%)



Aortic Valve

AoV Peak Higinio.125.0cm/sAoV VTI26.2cm

AO Peak GR.6.2mmHgLVOT Peak Higinio.97.2cm/s

AO Mean GR.4mmHgAVA (VMAX)2.99cm2

JOSE   (VTI)3.10cm2



Mitral Valve

MV E Odskegce10.9cm/sMV DECEL [id number]

MV A Muxxahfr27.4cm/sE/A  Ratio1.4



Pulmonary Vein

S1 Zedxdpok68.4cm/sD2 Yfdorwhl23.4cm/s



 LEFT VENTRICLE 

The left ventricle is normal size. There is normal left ventricular wall thickness. The left ventricu
lar systolic function is normal. The Ejection Fraction is 55-60%. There is normal LV segmental wall m
otion. The left ventricular diastolic function and filling is normal for age.



 RIGHT VENTRICLE 

The right ventricle is normal size. The right ventricular systolic function is normal.



 ATRIA 

The left atrium is mildly dilated. The right atrium is mildly dilated. The interatrial septum is inta
ct with no evidence for an atrial septal defect or patent foramen ovale as noted on 2-D or Doppler im
aging.



 AORTIC VALVE 

The aortic valve is normal in structure and function. Doppler and Color Flow revealed trace aortic re
gurgitation. There is no significant aortic valvular stenosis.



 MITRAL VALVE 

The mitral valve is normal in structure and function. There is no evidence of mitral valve prolapse. 
There is no mitral valve stenosis. Doppler and Color-flow revealed trace mitral regurgitation.



 TRICUSPID VALVE 

The tricuspid valve is normal in structure and function. Doppler and Color Flow revealed no tricuspid
 valve regurgitation noted. There is no tricuspid valve stenosis.



 PULMONIC VALVE 

The pulmonic valve is not well visualized. Doppler and Color Flow revealed trace pulmonic valvular re
gurgitation. There is no pulmonic valvular stenosis.



 GREAT VESSELS 

The aortic root is normal in size. The ascending aorta is normal in size. The IVC was not visualized.




 PERICARDIAL EFFUSION 

There is no evidence of significant pericardial effusion.



Critical Notification

Critical Value: No



<Conclusion>

The left ventricular systolic function is normal.

The Ejection Fraction is 55-60%.

There is normal LV segmental wall motion.

Doppler and Color-flow revealed trace mitral regurgitation.

There is no evidence of significant pericardial effusion.



Signed by : Lily Marrufo, 

Electronically Approved : 01/21/2021 16:32:21

## 2021-07-06 ENCOUNTER — HOSPITAL ENCOUNTER (OUTPATIENT)
Dept: HOSPITAL 61 - NM | Age: 51
End: 2021-07-06
Attending: INTERNAL MEDICINE
Payer: COMMERCIAL

## 2021-07-06 DIAGNOSIS — I25.10: Primary | ICD-10-CM

## 2021-07-06 PROCEDURE — A9500 TC99M SESTAMIBI: HCPCS

## 2021-07-06 PROCEDURE — 93017 CV STRESS TEST TRACING ONLY: CPT

## 2021-07-06 PROCEDURE — 78452 HT MUSCLE IMAGE SPECT MULT: CPT

## 2021-07-06 NOTE — RAD
MR#: [id number]

Account#: [id number]

Accession#: [id number]

Date of Study: 07/06/2021

Ordering Physician: LILY MARRUFO 

Referring Physician: CLARISSA FORTE Tech: RT MARIIA Pimentel) (N)





--------------- APPROVED REPORT --------------





Test Type:          Pharmacological

Stress Nurse/Tech: Zenaida López R.N.

Test Indications: CAD

Cardiac History: cardiac stents 2016

Medications:     See Electronic Medical Record

Medical History: See Electronic Medical Record

Resting ECG:     SB w/ inverted T wave in leads III, AVF,AVR,V1. Slight ST elevation lead V2

Resting Heart Rate: 53 bpm

Resting Blood Pressure: 124/84mmHg

Pretest Chest Pain: No chest pain



Nurse/Tech Notes

S1S2, lungs CTA

Consent: The procedure was explained to the patient in lay terms. Informed consent was witnessed. Bubba
eout was entered into [facility]. History and Stress Test performed by RT MARIIA Pimentel) (N)



Pharm. Details

Pharmacologic stress testing was performed using 0.4mg per 5ml of regadenoson given intravenously ove
r 7-10 seconds.



Stress Symptoms

SOA



POST EXERCISE

Reason for Termination: Infusion complete

Max HR: 87 bpm

Max Blood Pressure: 132/79mmHg

Blood Pressure response to exercise: Normal blood pressure response during stress.

Heart Rate response to exercise: WNL

Chest Pain: No. 

Arrhythmia: No. 

ST Change: No. 



INTERPRETATION

Stress EKG Conclusion: No evidence of stress induced EKG changes. 



Imaging Protocol

IMAGE PROTOCOL: Rest Tc-99m/stress Tc-99m 1 day



Rest:            Stress:         Viability:   

Radiopharm.Tc99m LafmarbmwTc54u Sestamibi

Dose10.2mCi            32mCi            

Duration    13min.           13min.           

Img Date  07/06/2021 07/06/2021      



Rest Admin Site:IV - Left AntecubitalAdministrator:RT MARIIA Ortiz)(N)

Stress Admin Site: IV - Left AntecubitalAdministrator: RT MARIIA Pimentel)(N)



STRESS DATA

End Diast. Vol.126.0mlLVEDV index BSA56.0ml

End Syst. Vol.32.0mlLVESV index BSA14.0ml

Myocardial Aand686.0gEject. Zjmuetmx33.0%



Stress Scores

Regional WT0.00Summed WT4.00

Regional WM0.00Summed WM0.00



The rest and stress images show normal perfusion, normal contraction and thickening.



LV Perf. Quant

17 Seg. SSS3.00

17 Seg. SRS1.00

17 Seg. SDS2.00

Stress Defect Extent (% LAD)0.00Rest Defect Extent (% LAD)0.00Rev. Defect Extent (% LAD)0.00

Stress Defect Extent (% LCX) 30.00Rest Defect Extent (% LCX)11.30Rev. Defect Extent (% LCX)20.00


Stress Defect Extent (% RCA)0.00Rest Defect Extent (% RCA)0.00Rev. Defect Extent (% RCA)0.00

Stress Defect Extent (% MARY GRACE)5.20Rest Defect Extent (% MARY GRACE)2.20Rev. Defect Extent (% MARY GRACE)3.50



Other Information

Quality:Average

Risk Assessment: Low Risk



Conclusion

1. No evidence of EKG changes with stress testing.

2. Normal perfusion at stress/rest.

3. Low risk study.

4. EF > 60%.



Signed by : Marshall Castillo, 

Electronically Approved : 07/06/2021 18:27:15

## 2022-02-03 ENCOUNTER — HOSPITAL ENCOUNTER (OUTPATIENT)
Dept: HOSPITAL 61 - ECHO | Age: 52
End: 2022-02-03
Attending: INTERNAL MEDICINE
Payer: COMMERCIAL

## 2022-02-03 DIAGNOSIS — I25.10: Primary | ICD-10-CM

## 2022-02-03 PROCEDURE — 93306 TTE W/DOPPLER COMPLETE: CPT

## 2022-02-03 PROCEDURE — C8929 TTE W OR WO FOL WCON,DOPPLER: HCPCS

## 2022-02-03 NOTE — CARD
MR#: [id number]

Account#: [id number]

Accession#: [id number]

Date of Study: 02/03/2022

Ordering Physician: LILY HOPKINS, 

Referring Physician: Ramón FORTE: Javier Barber [facility]





--------------- APPROVED REPORT --------------





EXAM: Two-dimensional and M-mode echocardiogram with Doppler and color Doppler.



Other Information 

Quality : AverageHR: 61bpm

Rhythm : NSR



INDICATION

Cardiac Disease: CAD 



RISK FACTORS

Hypertension 

Hyperlipidemia

Family History 



2D DIMENSIONS 

Left Atrium(2D)4.1 (1.6-4.0cm)IVSd1.0 (0.7-1.1cm)

Aortic Root(2D)4.1 (2.0-3.7cm)LVDd5.4 (3.9-5.9cm)

LVOT Diameter2.1 (1.8-2.4cm)PWd1.0 (0.7-1.1cm)

LVDs3.7 (2.5-4.0cm)FS (%) 32.3 %

SV86.0 mlLVEF(%)60.0 (>50%)



Aortic Valve

AoV Peak Higinio.118.0cm/sAoV VTI26.0cm

AO Peak GR.5.6mmHgLVOT Peak Higinio.89.5cm/s

AO Mean GR.3mmHgAVA (VMAX)2.67cm2



Mitral Valve

MV E Tapfnfge72.5cm/sMV E Peak Gr.3mmHg

MV DECEL [id number] A Kxvqsopq04.5cm/s

MV E Mean Gr.1mmHgE/A  Ratio1.3



Pulmonary Valve

PV Peak Gacxhcjt06.3cm/s



Tricuspid Valve

TR P. Gidotijd547ld/sTR Peak Gr.21mmHg



Pulmonary Vein

S1 Jysgcvhq05.5cm/sD2 Xihaupbm23.2cm/s



 LEFT VENTRICLE 

The left ventricle is normal size. There is normal left ventricular wall thickness. The left ventricu
lar systolic function is normal. The ejection fraction is 55-60%. There is normal LV segmental wall m
otion. The left ventricular diastolic function and filling is normal for age. No left ventricle throm
bus noted on this study. There is no ventricular septal defect visualized. There is no left ventricul
ar aneurysm. There is no mass noted in the left ventricle.



 RIGHT VENTRICLE 

The right ventricle is normal size. There is normal right ventricular wall thickness. The right ventr
icular systolic function is normal.



 ATRIA 

The left atrium size is normal. The right atrium size is normal. The interatrial septum is intact wit
h no evidence for an atrial septal defect or patent foramen ovale as noted on 2-D or Doppler imaging.




 AORTIC VALVE 

The aortic valve is normal in structure and function. Doppler and Color Flow revealed trace aortic re
gurgitation. There is no significant aortic valvular stenosis. There is no aortic valvular vegetation
.



 MITRAL VALVE 

The mitral valve is normal in structure and function. There is no evidence of mitral valve prolapse. 
There is no mitral valve stenosis. Doppler and Color Flow revealed no mitral valve regurgitation note
d.



 TRICUSPID VALVE 

The tricuspid valve is normal in structure and function. Doppler and Color Flow revealed trace tricus
pid regurgitation. There is no tricuspid valve prolapse or vegetation. There is no tricuspid valve st
enosis.



 PULMONIC VALVE 

The pulmonary valve is normal in structure and function. Doppler and Color Flow revealed no pulmonic 
valvular regurgitation. There is no pulmonic valvular stenosis.



 GREAT VESSELS 

The aortic root is normal in size. The ascending aorta is normal in size. The pulmonary artery is nor
mal. The IVC is normal in size and collapses >50% with inspiration.



 PERICARDIAL EFFUSION 

There is no pleural effusion. There is no evidence of significant pericardial effusion.



Critical Notification

Critical Value: No



<Conclusion>

The left ventricular systolic function is normal.

The ejection fraction is 55-60%.

There is normal LV segmental wall motion.

Trace tricuspid regurgitation.

There is no evidence of significant pericardial effusion.



Signed by : Lily Hopkins, 

Electronically Approved : 02/03/2022 16:35:50